# Patient Record
Sex: FEMALE | Race: WHITE | NOT HISPANIC OR LATINO | Employment: FULL TIME | URBAN - METROPOLITAN AREA
[De-identification: names, ages, dates, MRNs, and addresses within clinical notes are randomized per-mention and may not be internally consistent; named-entity substitution may affect disease eponyms.]

---

## 2019-02-21 ENCOUNTER — OFFICE VISIT (OUTPATIENT)
Dept: URGENT CARE | Facility: PHYSICIAN GROUP | Age: 39
End: 2019-02-21

## 2019-02-21 VITALS
HEIGHT: 63 IN | OXYGEN SATURATION: 97 % | DIASTOLIC BLOOD PRESSURE: 70 MMHG | BODY MASS INDEX: 20.91 KG/M2 | HEART RATE: 76 BPM | WEIGHT: 118 LBS | TEMPERATURE: 99.3 F | SYSTOLIC BLOOD PRESSURE: 122 MMHG | RESPIRATION RATE: 14 BRPM

## 2019-02-21 DIAGNOSIS — Z02.4 DRIVER'S PERMIT PHYSICAL EXAMINATION: ICD-10-CM

## 2019-02-21 PROCEDURE — 7100 PR DOT PHYSICAL: Performed by: FAMILY MEDICINE

## 2019-02-22 NOTE — PROGRESS NOTES
"Subjective:      Shirley Mckenna is a 38 y.o. female who presents with Employment Physical (DOT)            Patient is here for DOT physical, otherwise no complaints.        ROS       Objective:     /70   Pulse 76   Temp 37.4 °C (99.3 °F) (Temporal)   Resp 14   Ht 1.588 m (5' 2.5\")   Wt 53.5 kg (118 lb)   SpO2 97%   BMI 21.24 kg/m²      Physical Exam            Assessment/Plan:     ASSESSMENT:PLAN:  1. 's permit physical examination    History and physical and form see the scanned forms  Cleared for 2 years  We discussed tobacco use and encourage quitting and also establishing care and following up for screening testing that she needs at her age.      "

## 2019-03-28 ENCOUNTER — OFFICE VISIT (OUTPATIENT)
Dept: URGENT CARE | Facility: CLINIC | Age: 39
End: 2019-03-28
Payer: COMMERCIAL

## 2019-03-28 ENCOUNTER — APPOINTMENT (OUTPATIENT)
Dept: RADIOLOGY | Facility: IMAGING CENTER | Age: 39
End: 2019-03-28
Attending: FAMILY MEDICINE
Payer: COMMERCIAL

## 2019-03-28 VITALS
RESPIRATION RATE: 16 BRPM | DIASTOLIC BLOOD PRESSURE: 64 MMHG | OXYGEN SATURATION: 98 % | BODY MASS INDEX: 21.71 KG/M2 | HEART RATE: 78 BPM | SYSTOLIC BLOOD PRESSURE: 118 MMHG | TEMPERATURE: 99.8 F | HEIGHT: 62 IN | WEIGHT: 118 LBS

## 2019-03-28 DIAGNOSIS — M79.604 RIGHT LEG PAIN: ICD-10-CM

## 2019-03-28 PROCEDURE — 73590 X-RAY EXAM OF LOWER LEG: CPT | Mod: TC,RT | Performed by: FAMILY MEDICINE

## 2019-03-28 PROCEDURE — 99214 OFFICE O/P EST MOD 30 MIN: CPT | Performed by: FAMILY MEDICINE

## 2019-03-29 NOTE — PROGRESS NOTES
"Subjective:      Shirley Mckenna is a 38 y.o. female who presents with Stress (Feels pain in tibia x 4 days )            This is a new problem  38-year-old female presenting with right leg pain anterior for the past 3 days.  She does not recall any injury.  Denies any paresthesia or radiculopathy.  Works 2 jobs and she also works at the [employer].  No prior fracture reported.  She was worried about stress fracture of the tibia.        Review of Systems   All other systems reviewed and are negative.         Objective:     /64   Pulse 78   Temp 37.7 °C (99.8 °F)   Resp 16   Ht 1.575 m (5' 2\")   Wt 53.5 kg (118 lb)   SpO2 98%   BMI 21.58 kg/m²      Physical Exam   Constitutional: She is oriented to person, place, and time. She appears well-developed and well-nourished.  Non-toxic appearance. No distress.   HENT:   Head: Normocephalic and atraumatic.   Right Ear: External ear normal.   Left Ear: External ear normal.   Eyes: Conjunctivae are normal.   Cardiovascular: Normal rate.    Pulmonary/Chest: Effort normal. No respiratory distress.   Musculoskeletal:        Right knee: Normal.        Right ankle: Normal.        Legs:  Very small hue of a bruise noted here which is tender to palpation   Neurological: She is alert and oriented to person, place, and time.   Skin: Skin is warm. No pallor.   Psychiatric: She has a normal mood and affect.     X-ray of the right tib-fib: Negative for acute abnormality my read          Assessment/Plan:     1. Right leg pain  - DX-TIBIA AND FIBULA RIGHT; Future    Likely contusion  Xray is negative and reassured (left a message on the phone, because patient left the  earlier)  OTC meds prn pain and icing  Follow up if not significantly improved as expected in 7-10 days, sooner if any worsening or new symptoms      "

## 2021-02-10 ENCOUNTER — OFFICE VISIT (OUTPATIENT)
Dept: URGENT CARE | Facility: PHYSICIAN GROUP | Age: 41
End: 2021-02-10

## 2021-02-10 VITALS
TEMPERATURE: 97.4 F | BODY MASS INDEX: 23 KG/M2 | SYSTOLIC BLOOD PRESSURE: 102 MMHG | HEART RATE: 81 BPM | WEIGHT: 125 LBS | HEIGHT: 62 IN | OXYGEN SATURATION: 98 % | DIASTOLIC BLOOD PRESSURE: 54 MMHG | RESPIRATION RATE: 16 BRPM

## 2021-02-10 DIAGNOSIS — Z02.4 ENCOUNTER FOR COMMERCIAL DRIVER MEDICAL EXAMINATION (CDME): ICD-10-CM

## 2021-02-10 PROCEDURE — 7100 PR DOT PHYSICAL: Performed by: NURSE PRACTITIONER

## 2021-02-10 NOTE — PROGRESS NOTES
CC) Here today for DOT -employment physical exam.   S) Reviewed History with pt.        No complaints    O) See physical exam forms and diagnostics attached to visit today.       A)   1. Encounter for  medical examination (CDME)           P) as documented on physical exam forms.   Qualified for 2 year certification

## 2021-06-09 PROBLEM — M47.818 ARTHRITIS OF RIGHT SACROILIAC JOINT: Status: ACTIVE | Noted: 2021-06-09

## 2021-06-09 PROBLEM — M79.18 MYOFASCIAL PAIN SYNDROME OF LUMBAR SPINE: Status: ACTIVE | Noted: 2021-06-09

## 2021-06-09 PROBLEM — M54.9 INTRACTABLE BACK PAIN: Status: ACTIVE | Noted: 2021-06-09

## 2021-06-09 PROBLEM — M51.36 OTHER INTERVERTEBRAL DISC DEGENERATION, LUMBAR REGION: Status: ACTIVE | Noted: 2021-06-09

## 2022-11-08 ENCOUNTER — HOSPITAL ENCOUNTER (OUTPATIENT)
Dept: RADIOLOGY | Facility: MEDICAL CENTER | Age: 42
End: 2022-11-08
Attending: STUDENT IN AN ORGANIZED HEALTH CARE EDUCATION/TRAINING PROGRAM
Payer: COMMERCIAL

## 2022-11-08 ENCOUNTER — OCCUPATIONAL MEDICINE (OUTPATIENT)
Dept: URGENT CARE | Facility: PHYSICIAN GROUP | Age: 42
End: 2022-11-08
Payer: COMMERCIAL

## 2022-11-08 VITALS
HEIGHT: 62 IN | HEART RATE: 91 BPM | OXYGEN SATURATION: 98 % | DIASTOLIC BLOOD PRESSURE: 80 MMHG | TEMPERATURE: 98.6 F | RESPIRATION RATE: 16 BRPM | BODY MASS INDEX: 22.08 KG/M2 | SYSTOLIC BLOOD PRESSURE: 128 MMHG | WEIGHT: 120 LBS

## 2022-11-08 DIAGNOSIS — M79.672 LEFT FOOT PAIN: ICD-10-CM

## 2022-11-08 DIAGNOSIS — M65.9 TENOSYNOVITIS OF TIBIALIS POSTERIOR TENDON: ICD-10-CM

## 2022-11-08 PROCEDURE — 73630 X-RAY EXAM OF FOOT: CPT | Mod: RT

## 2022-11-08 PROCEDURE — 99213 OFFICE O/P EST LOW 20 MIN: CPT | Performed by: STUDENT IN AN ORGANIZED HEALTH CARE EDUCATION/TRAINING PROGRAM

## 2022-11-08 NOTE — LETTER
"EMPLOYEE’S CLAIM FOR COMPENSATION/ REPORT OF INITIAL TREATMENT  FORM C-4    EMPLOYEE’S CLAIM - PROVIDE ALL INFORMATION REQUESTED   First Name  Shirley Last Name  Bala Birthdate                    1980                Sex  female Claim Number (Insurer’s Use Only)    Home Address  [address] Age  42 y.o. Height  1.575 m (5' 2\") Weight  54.4 kg (120 lb) [facility]     [facility] Zip  [address] Telephone  [phone] (home)    Mailing Address  [address]  [facility] Zip  [address] Primary Language Spoken  English    Insurer   Third-Party   Dontae Villagran   Employee's Occupation (Job Title) When Injury or Occupational Disease Occurred      Employer's Name/Company Name  [employer]  Telephone  [phone]    Office Mail Address (Number and Street)   [address] 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 (Be specific an answer in detail. Use additional sheet if necessary)  onset of pain   If you believe that you have an occupational disease, when did you first have knowledge of the disability and it relationship to your employment?   Witnesses to the Accident  None      Nature of Injury or Occupational Disease  Workers' Compensation  Part(s) of Body Injured or Affected  Foot (R), Foot (R),     I certify that the above is true and correct to the best of my knowledge and that I have provided this information in order to obtain the benefits of [place]  I hereby authorize any physician, chiropractor, surgeon, practitioner, or other person, any hospital, including [facility] hospital, any medical service organization, any insurance company, or other institution or organization to release to each other, any medical or other information, including benefits paid or payable, pertinent to this injury or disease, except information relative to diagnosis, treatment and/or counseling for AIDS, psychological conditions, alcohol or controlled substances, for which I must give specific authorization.  A Photostat of this authorization shall be as valid as the original.     Date   Place Employee’s Original or  *Electronic Signature   THIS REPORT MUST BE COMPLETED AND MAILED WITHIN 3 WORKING DAYS OF TREATMENT   [facility]  Name of Facility  [place]   Date  11/8/2022 Diagnosis and Description of Injury or Occupational Disease  (M79.672) right foot pain  (M65.9) Tenosynovitis of tibialis posterior tendon Is there evidence the injured employee was under the influence of alcohol and/or another controlled substance at the time of accident?  ? No ? Yes (if yes, please explain)    Hour  10:19 AM   Diagnoses of right foot pain and Tenosynovitis of tibialis posterior tendon were pertinent to this visit. No   Treatment  X-rays negative for any acute osseous abnormalities.  Suspect symptoms secondary to posterior tibialis tendon dysfunction.  -Restrictions per D 39  -Recommended getting insoles (eg Superfeet)  - Ibuprofen 800 mg every 8 hours as needed for pain  - Follow-up with occupational medicine in 1 week for reevaluation at [address] [place] [address].    Have you advised the patient to remain off work five days or     more?    X-Ray Findings  Negative   ? Yes Indicate dates:   From   To      From information given by the employee, together with medical evidence, can        you  "directly connect this injury or occupational disease as job incurred?  No ? No If no, is the injured employee capable of:  ? full duty  No ? modified duty  Yes   Is additional medical care by a physician indicated?  Yes If Modified Duty, Specify any Limitations / Restrictions  See d39   Do you know of any previous injury or disease contributing to this condition or occupational disease?  ? Yes ? No (Explain if yes)                          No   Date  11/8/2022 Print Health Care Provider's   Sid Rodriguez D.O. I certify the employer’s copy of  this form was mailed on:   Address  [address] Insurer’s Use Only     [facility] Zip  [address]    Provider’s Tax ID Number  [id number] Telephone  Dept: [phone]             Health Care Provider’s Original or Electronic Signature  e-SID Herman D.O. Degree (MD,DO, DC,PAPeriC,APRN)   DO      * Complete and attach Release of Information (Form C-4A) when injured employee signs C-4 Form electronically  ORIGINAL - TREATING HEALTHCARE PROVIDER PAGE 2 - INSURER/TPA PAGE 3 - EMPLOYER PAGE 4 - EMPLOYEE             Form C-4 (rev.08/21)           BRIEF DESCRIPTION OF RIGHTS AND BENEFITS  (Pursuant to [place] 616C.050)    Notice of Injury or Occupational Disease (Incident Report Form C-1): If an injury or occupational disease (OD) arises out of and in the course of employment, you must provide written notice to your employer as soon as practicable, but no later than 7 days after the accident or OD. Your employer shall maintain a sufficient supply of the required forms.    Claim for Compensation (Form C-4): If medical treatment is sought, the form C-4 is available at the place of initial treatment. A completed \"Claim for Compensation\" (Form C-4) must be filed within 90 days after an accident or OD. The treating physician or chiropractor must, within 3 working days after treatment, complete and mail to the employer, the employer's insurer and third-party " , the Claim for Compensation.    Medical Treatment: If you require medical treatment for your on-the-job injury or OD, you may be required to select a physician or chiropractor from a list provided by your workers’ compensation insurer, if it has contracted with an Organization for Managed Care (MCO) or Preferred Provider Organization (PPO) or providers of health care. If your employer has not entered into a contract with an MCO or PPO, you may select a physician or chiropractor from the Panel of Physicians and Chiropractors. Any medical costs related to your industrial injury or OD will be paid by your insurer.    Temporary Total Disability (TTD): If your doctor has certified that you are unable to work for a period of at least 5 consecutive days, or 5 cumulative days in a 20-day period, or places restrictions on you that your employer does not accommodate, you may be entitled to TTD compensation.    Temporary Partial Disability (TPD): If the wage you receive upon reemployment is less than the compensation for TTD to which you are entitled, the insurer may be required to pay you TPD compensation to make up the difference. TPD can only be paid for a maximum of 24 months.    Permanent Partial Disability (PPD): When your medical condition is stable and there is an indication of a PPD as a result of your injury or OD, within 30 days, your insurer must arrange for an evaluation by a rating physician or chiropractor to determine the degree of your PPD. The amount of your PPD award depends on the date of injury, the results of the PPD evaluation, your age and wage.    Permanent Total Disability (PTD): If you are medically certified by a treating physician or chiropractor as permanently and totally disabled and have been granted a PTD status by your insurer, you are entitled to receive monthly benefits not to exceed 66 2/3% of your average monthly wage. The amount of your PTD payments is subject to reduction  if you previously received a lump-sum PPD award.    Vocational Rehabilitation Services: You may be eligible for vocational rehabilitation services if you are unable to return to the job due to a permanent physical impairment or permanent restrictions as a result of your injury or occupational disease.    Transportation and Per Antwon Reimbursement: You may be eligible for travel expenses and per antwon associated with medical treatment.    Reopening: You may be able to reopen your claim if your condition worsens after claim closure.     Appeal Process: If you disagree with a written determination issued by the insurer or the insurer does not respond to your request, you may appeal to the Department of Administration, , by following the instructions contained in your determination letter. You must appeal the determination within 70 days from the date of the determination letter at [address], [address], [place], [place] [address], or [address], [address], [place] [address]. If you disagree with the  decision, you may appeal to the Department of Administration, . You must file your appeal within 30 days from the date of the  decision letter at [address], [address], [place], [place] [address], or [address], [address], [place], [place] [address]. If you disagree with a decision of an , you may file a petition for judicial review with the District Court. You must do so within 30 days of the Appeal Officer’s decision. You may be represented by an  at your own expense or you may contact the [facility] for possible representation.    [place]  for Injured Workers (NAIW): If you disagree with a  decision, you may request that NAIW represent you without charge at an  Hearing. For information regarding denial of benefits, you may contact the [facility] at: [address], [address], [place] [address], [phone]) [phone], or [address], [address], [place] [address], [phone]    To File a Complaint with the Division: If you wish to file a complaint with the  of the Division of Industrial Relations (DIR),  please contact the Workers’ Compensation Section, [address], [address], [place], [place] [address], telephone (196) [phone], or [address], [address], [place], [place] [address], telephone [phone]) [phone].    For assistance with Workers’ Compensation Issues: You may contact the [facility] Office for Consumer Health Assistance, [address] [facility] [address], [place] [address], Toll Free 1-646.885.3052, Web site: http://[facility].nv.[facility]/Programs/ROSA E-mail: [email or web address].[email or web address]              __________________________________________________________________                                    _________________            Employee Name / Signature                                                                                                                            Date                                                                                                                                                                                                                              D-2 (rev. 10/20)

## 2022-11-08 NOTE — PROGRESS NOTES
"Subjective:     Shirley Mckenna is a 42 y.o. female who presents for Work-Related Injury ([employer] R foot)      Today's date: 11/8/2022  Date of injury: Sometime in October  CC: Right foot pain  Patient presents with a chief complaint of pain along the medial margins of her left foot of insidious onset for approximately 5-6 weeks.  She points to the medial margins of the navicular as source of discomfort.  Symptoms are aggravated with weightbearing and local palpation to the region.  She has tried wearing an ankle brace which has not helped.  Denies any associated symptoms of bruising or swelling.  No history of trauma or surgery to the region.    PMH:   No pertinent past medical history to this problem  MEDS:  Medications were reviewed in EMR  ALLERGIES:  Allergies were reviewed in EMR  FH:   No pertinent family history to this problem       Objective:     /80   Pulse 91   Temp 37 °C (98.6 °F) (Temporal)   Resp 16   Ht 1.575 m (5' 2\")   Wt 54.4 kg (120 lb)   LMP  (LMP Unknown)   SpO2 98%   BMI 21.95 kg/m²     Gen: no acute distress, normal voice  Skin: dry, intact, moist mucosal membranes  Head: Atraumatic, normocephalic  Psych: normal affect, normal judgement, alert, awake  Musculoskeletal: Right foot: No erythema, ecchymosis or edema.  Full range of motion in all planes associated with mild discernible discomfort with resisted plantar flexion.  Focal TTP along the medial margins of the navicular.  No additional focal tenderness to palpation.      RADIOLOGY RESULTS  DX-FOOT-COMPLETE 3+ RIGHT    Result Date: 11/8/2022 11/8/2022 10:54 AM HISTORY/REASON FOR EXAM:  Atraumatic Pain/Swelling/Deformity; ttp along navicular Right medial foot pain TECHNIQUE/EXAM DESCRIPTION AND NUMBER OF VIEWS: 3 views of the RIGHT foot. COMPARISON:  Right leg x-ray 3/20/2019 FINDINGS: There is no fracture. Alignment is normal. No degenerative changes are present. 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 There is a " plantar calcaneal spur.     1.  Accessory navicular, anatomic variant 2.  Plantar calcaneal spur            Assessment/Plan:       1. right foot pain  - DX-FOOT-COMPLETE 3+ RIGHT; Future    2. Tenosynovitis of tibialis posterior tendon    Released to Full Duty FROM 11/8/2022 TO 11/15/2022  X-rays negative for any acute osseous abnormalities.  Suspect symptoms secondary to posterior tibialis tendon dysfunction.  -Restrictions per D 39  -Recommended getting insoles  - Ibuprofen 800 mg every 8 hours as needed for pain  - Follow-up with occupational medicine in 1 week for reevaluation at [address] [place] [address].       Differential diagnosis, natural history, supportive care, and indications for immediate follow-up discussed.

## 2022-11-08 NOTE — LETTER
[facility]  [address] [facility] [facility] [place] [address]  Phone:  [phone] - Fax:  [phone]   [facility] Progress Report and Disability Certification  Date of Service: 11/8/2022   No Show:  No  Date / Time of Next Visit: 11/15/2022 w/ OCC MED   Claim Information   Patient Name: Shirley Mckenna  Claim Number:     Employer: [employer]  Date of Injury: 10/4/2022     Insurer / TPA: Dontae [place]  ID / SSN:     Occupation:   Diagnosis: Diagnoses of right foot pain and Tenosynovitis of tibialis posterior tendon were pertinent to this visit.    Medical Information   Related to Industrial Injury?   Comments:possibly    Subjective Complaints:  Today's date: 11/8/2022  Date of injury: Sometime in October  CC: Right foot pain  Patient presents with a chief complaint of pain along the medial margins of her left foot of insidious onset for approximately 5-6 weeks.  She points to the medial margins of the navicular as source of discomfort.  Symptoms are aggravated with weightbearing and local palpation to the region.  She has tried wearing an ankle brace which has not helped.  Denies any associated symptoms of bruising or swelling.  No history of trauma or surgery to the region.   Objective Findings: Gen: no acute distress, normal voice  Skin: dry, intact, moist mucosal membranes  Head: Atraumatic, normocephalic  Psych: normal affect, normal judgement, alert, awake  Musculoskeletal: Right foot: No erythema, ecchymosis or edema.  Full range of motion in all planes associated with mild discernible discomfort with resisted plantar flexion.  Focal TTP along the medial margins of the navicular.  No additional focal tenderness to palpation.      RADIOLOGY RESULTS  DX-FOOT-COMPLETE 3+ RIGHT    Result Date: 11/8/2022 11/8/2022 10:54 AM HISTORY/REASON FOR EXAM:  Atraumatic Pain/Swelling/Deformity; ttp along navicular Right medial foot pain TECHNIQUE/EXAM  DESCRIPTION AND NUMBER OF VIEWS: 3 views of the RIGHT foot. COMPARISON:  Right leg x-ray 3/20/2019 FINDINGS: There is no fracture. Alignment is normal. No degenerative changes are present. 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 There is a plantar calcaneal spur.     1.  Accessory navicular, anatomic variant 2.  Plantar calcaneal spur           Pre-Existing Condition(s):     Assessment:   Initial Visit    Status: Additional Care Required  Permanent Disability:No    Plan:      Diagnostics:      Comments:       Disability Information   Status: Released to Full Duty    From:  11/8/2022  Through: 11/15/2022 Restrictions are: Temporary   Physical Restrictions   Sitting:    Standing:  < or = to 4 hrs/day Stooping:    Bending:      Squatting:    Walking:  < or = to 4 hrs/day Climbing:    Pushing:      Pulling:    Other:    Reaching Above Shoulder (L):   Reaching Above Shoulder (R):       Reaching Below Shoulder (L):    Reaching Below Shoulder (R):      Not to exceed Weight Limits   Carrying(hrs):   Weight Limit(lb): < or = to 25 pounds Lifting(hrs):   Weight  Limit(lb): < or = to 25 pounds   Comments: X-rays negative for any acute osseous abnormalities.  Suspect symptoms secondary to posterior tibialis tendon dysfunction.  -Restrictions per D 39  -Recommended getting insoles  - Ibuprofen 800 mg every 8 hours as needed for pain  - Follow-up with occupational medicine in 1 week for reevaluation at [address] [place] [address]    Repetitive Actions   Hands: i.e. Fine Manipulations from Grasping:     Feet: i.e. Operating Foot Controls:     Driving / Operate Machinery:     Health Care Provider’s Original or Electronic Signature  Sid Rodriguez D.O. Health Care Provider’s Original or Electronic Signature    Luther Holt MD         Clinic Name / Location: [facility] [address] [address] Clinic Phone Number: Dept: [phone]   Appointment Time: 9:45 Am Visit Start Time: 10:19 AM   Check-In Time:   9:50 Am Visit Discharge Time:     Original-Treating Physician or Chiropractor    Page 2-Insurer/TPA    Page 3-Employer    Page 4-Employee

## 2022-12-06 ENCOUNTER — OCCUPATIONAL MEDICINE (OUTPATIENT)
Dept: OCCUPATIONAL MEDICINE | Facility: CLINIC | Age: 42
End: 2022-12-06
Payer: COMMERCIAL

## 2022-12-06 VITALS
DIASTOLIC BLOOD PRESSURE: 72 MMHG | BODY MASS INDEX: 22.08 KG/M2 | HEIGHT: 62 IN | WEIGHT: 120 LBS | TEMPERATURE: 99.5 F | OXYGEN SATURATION: 98 % | RESPIRATION RATE: 18 BRPM | SYSTOLIC BLOOD PRESSURE: 108 MMHG | HEART RATE: 90 BPM

## 2022-12-06 DIAGNOSIS — M65.9 TENOSYNOVITIS OF TIBIALIS POSTERIOR TENDON: ICD-10-CM

## 2022-12-06 PROCEDURE — 99213 OFFICE O/P EST LOW 20 MIN: CPT | Performed by: PREVENTIVE MEDICINE

## 2022-12-06 RX ORDER — MELOXICAM 15 MG/1
15 TABLET ORAL DAILY
Qty: 30 TABLET | Refills: 0 | Status: SHIPPED | OUTPATIENT
Start: 2022-12-06 | End: 2023-03-22

## 2022-12-06 NOTE — PROGRESS NOTES
"Subjective:     Shirley Mckenna is a 42 y.o. 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 VERITO: Gradual onset of pain along the medial margins of her left foot of insidious onset for approximately 5-6 weeks. Seen in UCx1, XR of right foot negative for acute findings. Suspected symptoms secondary to posterior tibialis tendon dysfunction recommended shoe inserts and Ibuprofen 800mg TID.  Patient states that symptoms remain the same.  She has been on light duty and taking ibuprofen and got new shoe inserts she states that neither of these interventions have improved symptoms.  Continues with pain on the medial midfoot region.  Pain worse with prolonged standing or walking.  She also notes some pain along the Achilles tendon.  This is less prominent than pain on the foot.  She denies any numbness or tingling.    ROS: All systems were reviewed on intake form, form was reviewed and signed. See scanned documents in media. Pertinent positives and negatives included in HPI.    PMH: No pertinent past medical history to this problem  MEDS: Medications were reviewed in Epic  ALLERGIES:   Allergies   Allergen Reactions    Penicillins Shortness of Breath and Swelling    Erythromycin      Pt does not know why she can't take     SOCHX: Works as a  at [employer]  FH: No pertinent family history to this problem       Objective:     /72   Pulse 90   Temp 37.5 °C (99.5 °F)   Resp 18   Ht 1.575 m (5' 2\")   Wt 54.4 kg (120 lb)   LMP  (LMP Unknown)   SpO2 98%   BMI 21.95 kg/m²     Constitutional: Patient is in no acute distress. Appears well-developed and well-nourished.   HENT: Normocephalic and atraumatic. EOM are normal. No scleral icterus.   Cardiovascular: Normal rate.    Pulmonary/Chest: Effort normal. No respiratory distress.   Neurological: Patient is alert and oriented to person, place, and time. "   Skin: Skin is warm and dry.   Psychiatric: Normal mood and affect. Behavior is normal.     Right foot: No gross deformity.  Tenderness over the medial navicular.  Some minimal tenderness over the distal Achilles tendon as well.  No defect felt in the Achilles tendon.  Full range of motion without discomfort.  Strength grossly intact of the foot.    Assessment/Plan:       1. Tenosynovitis of tibialis posterior tendon  - Referral to Physical Therapy  - meloxicam (MOBIC) 15 MG tablet; Take 1 Tablet by mouth every day.  Dispense: 30 Tablet; Refill: 0    Released to Restricted Duty FROM 12/6/2022 TO 1/4/2023  Seated work 25% of shift, alternate sitting/standing as needed. 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    Approximately 35 minutes were spent in reviewing notes, preparing for visit, obtaining history, exam and evaluation, patient counseling/education and post visit documentation/orders.

## 2022-12-06 NOTE — LETTER
[address],   [address] [place] [address]  Phone:  [phone] - Fax:  [phone]   [facility] Progress Report and Disability Certification  Date of Service: 12/6/2022   No Show:  No  Date / Time of Next Visit: 1/4/2023 @ 10:30am   Claim Information   Patient Name: Shirley Mckenna  Claim Number:     Employer: [employer]  Date of Injury: 10/4/2022     Insurer / TPA: Dontae Chalfont  ID / SSN:     Occupation:   Diagnosis: The encounter diagnosis was Tenosynovitis of tibialis posterior tendon.    Medical Information   Related to Industrial Injury?      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 VERITO: Gradual onset of pain along the medial margins of her left foot of insidious onset for approximately 5-6 weeks. Seen in UCx1, XR of right foot negative for acute findings. Suspected symptoms secondary to posterior tibialis tendon dysfunction recommended shoe inserts and Ibuprofen 800mg TID.  Patient states that symptoms remain the same.  She has been on light duty and taking ibuprofen and got new shoe inserts she states that neither of these interventions have improved symptoms.  Continues with pain on the medial midfoot region.  Pain worse with prolonged standing or walking.  She also notes some pain along the Achilles tendon.  This is less prominent than pain on the foot.  She denies any numbness or tingling.   Objective Findings: Right foot: No gross deformity.  Tenderness over the medial navicular.  Some minimal tenderness over the distal Achilles tendon as well.  No defect felt in the Achilles tendon.  Full range of motion without discomfort.  Strength grossly intact of the foot.   Pre-Existing Condition(s):     Assessment:   Condition Same    Status: Additional Care Required  Permanent Disability:No    Plan:      Diagnostics:      Comments:  Recommend continued  conservative management  Referral to physical therapy and prescribed meloxicam 50 mg once daily  May take Tylenol 1000 mg 3 times daily as well  Continue shoes with new inserts  Range of motion/strengthening exercises as demonstrated  Ice 20 minutes 3-3 times per day as needed  OTC muscle creams/ointments as needed  Restricted duty  Follow-up 4 weeks    Disability Information   Status: Released to Restricted Duty    From:  12/6/2022  Through: 1/4/2023 Restrictions are: Temporary   Physical Restrictions   Sitting:    Standing:  < or = to 6 hrs/day Stooping:    Bending:      Squatting:    Walking:  < or = to 6 hrs/day Climbing:    Pushing:      Pulling:    Other:    Reaching Above Shoulder (L):   Reaching Above Shoulder (R):       Reaching Below Shoulder (L):    Reaching Below Shoulder (R):      Not to exceed Weight Limits   Carrying(hrs):   Weight Limit(lb): < or = to 25 pounds Lifting(hrs):   Weight  Limit(lb): < or = to 25 pounds   Comments: Seated work 25% of shift, alternate sitting/standing as needed. 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 Fine Manipulations from Grasping:     Feet: i.e. Operating Foot Controls:     Driving / Operate Machinery:     Health Care Provider’s Original or Electronic Signature  Jb Payne D.O. Health Care Provider’s Original or Electronic Signature    Jb Payne DO MPH     Clinic Name / Location: [address],   [address]  [place] [address] Clinic Phone Number: Dept: [phone]   Appointment Time: 1:00 Pm Visit Start Time: 1:00 PM   Check-In Time:  12:38 Pm Visit Discharge Time:  130pm   Original-Treating Physician or Chiropractor    Page 2-Insurer/TPA    Page 3-Employer    Page 4-Employee

## 2023-01-04 ENCOUNTER — OCCUPATIONAL MEDICINE (OUTPATIENT)
Dept: OCCUPATIONAL MEDICINE | Facility: CLINIC | Age: 43
End: 2023-01-04
Payer: COMMERCIAL

## 2023-01-04 VITALS
HEART RATE: 100 BPM | OXYGEN SATURATION: 95 % | RESPIRATION RATE: 18 BRPM | DIASTOLIC BLOOD PRESSURE: 74 MMHG | BODY MASS INDEX: 22.08 KG/M2 | HEIGHT: 62 IN | WEIGHT: 120 LBS | SYSTOLIC BLOOD PRESSURE: 120 MMHG

## 2023-01-04 DIAGNOSIS — M65.9 TENOSYNOVITIS OF TIBIALIS POSTERIOR TENDON: ICD-10-CM

## 2023-01-04 PROCEDURE — 99212 OFFICE O/P EST SF 10 MIN: CPT | Performed by: PREVENTIVE MEDICINE

## 2023-01-04 NOTE — PROGRESS NOTES
"Subjective:     Shirley Mckenna is a 42 y.o. 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 VERITO: Gradual onset of pain along the medial margins of her left foot of insidious onset for approximately 5-6 weeks.  XR of right foot negative for acute findings.  Patient states overall symptoms have been gradually improving over the last few weeks.  She states the meloxicam has been helping.  She states she feels almost ready to return to full duty.  Of note her claim was denied and so the physical therapy referral was denied as well.    ROS           Objective:     /74   Pulse 100   Resp 18   Ht 1.575 m (5' 2\")   Wt 54.4 kg (120 lb)   SpO2 95%   BMI 21.95 kg/m²     Constitutional: Patient is in no acute distress. Appears well-developed and well-nourished.   Cardiovascular: Normal rate.    Pulmonary/Chest: Effort normal. No respiratory distress.   Neurological: Patient is alert and oriented to person, place, and time.   Skin: Skin is warm and dry.   Psychiatric: Normal mood and affect. Behavior is normal.     Right foot: No gross deformity.  Full range of motion.  Normal gait.    Assessment/Plan:       1. Tenosynovitis of tibialis posterior tendon      Comments:Restricted duty through Saturday, January 7, may begin full duty starting Sunday January 8 FROM 1/4/2023 TO    Restrictions thru Jan 7th: Seated work 25% of shift, alternate sitting/standing as needed. 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  Restricted duty through Saturday, January 7, may begin full duty starting Sunday January 8  Expect gradual resolution of symptoms over the next few weeks  Restricted duty through Saturday, January 7, may begin full duty starting Sunday January 8  Release from care  Follow-up as needed    Differential diagnosis, natural history, supportive care, and indications for immediate follow-up " discussed.    Approximately 15 minutes was spent in preparing for visit, obtaining history, exam and evaluation, patient counseling/education and post visit documentation/orders.

## 2023-01-04 NOTE — LETTER
[address],   [address] [place] [address]  Phone:  [phone] - Fax:  [phone]   [facility] Progress Report and Disability Certification  Date of Service: 1/4/2023   No Show:  No  Date / Time of Next Visit:  Release from care   Claim Information   Patient Name: Shirley Mckenna  Claim Number:     Employer: [employer]  Date of Injury: 10/4/2022     Insurer / TPA: Dontae Charlotte  ID / SSN:     Occupation:   Diagnosis: The encounter diagnosis was Tenosynovitis of tibialis posterior tendon.    Medical Information   Related to Industrial Injury? 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 VERITO: Gradual onset of pain along the medial margins of her left foot of insidious onset for approximately 5-6 weeks.  XR of right foot negative for acute findings.  Patient states overall symptoms have been gradually improving over the last few weeks.  She states the meloxicam has been helping.  She states she feels almost ready to return to full duty.  Of note her claim was denied and so the physical therapy referral was denied as well.   Objective Findings: Right foot: No gross deformity.  Full range of motion.  Normal gait.   Pre-Existing Condition(s):     Assessment:   Condition Improved    Status: Discharged /  MMI  Permanent Disability:No    Plan:      Diagnostics:      Comments:  Expect gradual resolution of symptoms over the next few weeks  Restricted duty through Saturday, January 7, may begin full duty starting Sunday January 8  Release from care  Follow-up as needed    Disability Information   Status:   Comments:Restricted duty through Saturday, January 7, may begin full duty starting Sunday January 8    From:  1/4/2023  Through:   Restrictions are:     Physical Restrictions   Sitting:    Standing:    Stooping:    Bending:      Squatting:     Walking:    Climbing:    Pushing:      Pulling:    Other:    Reaching Above Shoulder (L):   Reaching Above Shoulder (R):       Reaching Below Shoulder (L):    Reaching Below Shoulder (R):      Not to exceed Weight Limits   Carrying(hrs):   Weight Limit(lb):   Lifting(hrs):   Weight  Limit(lb):     Comments: Restrictions thru Jan 7th: Seated work 25% of shift, alternate sitting/standing as needed. 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  Restricted duty through Saturday, January 7, may begin full duty starting Sunday January 8    Repetitive Actions   Hands: i.e. Fine Manipulations from Grasping:     Feet: i.e. Operating Foot Controls:     Driving / Operate Machinery:     Health Care Provider’s Original or Electronic Signature  Jb Payne D.O. Health Care Provider’s Original or Electronic Signature    Jb Payne DO MPH     Clinic Name / Location: [address],   [address]  [place] [address] Clinic Phone Number: Dept: [phone]   Appointment Time: 10:30 Am Visit Start Time: 10:27 AM   Check-In Time:  10:18 Am Visit Discharge Time:  10:58AM   Original-Treating Physician or Chiropractor    Page 2-Insurer/TPA    Page 3-Employer    Page 4-Employee

## 2023-03-01 ENCOUNTER — HOSPITAL ENCOUNTER (OUTPATIENT)
Dept: RADIOLOGY | Facility: MEDICAL CENTER | Age: 43
End: 2023-03-01
Attending: ORTHOPAEDIC SURGERY
Payer: COMMERCIAL

## 2023-03-01 DIAGNOSIS — Q74.2 ACCESSORY NAVICULAR BONE OF RIGHT FOOT: ICD-10-CM

## 2023-03-01 PROCEDURE — 73700 CT LOWER EXTREMITY W/O DYE: CPT | Mod: RT

## 2023-03-11 ENCOUNTER — OFFICE VISIT (OUTPATIENT)
Dept: URGENT CARE | Facility: PHYSICIAN GROUP | Age: 43
End: 2023-03-11

## 2023-03-11 VITALS
BODY MASS INDEX: 22.6 KG/M2 | HEART RATE: 73 BPM | TEMPERATURE: 98 F | OXYGEN SATURATION: 98 % | RESPIRATION RATE: 16 BRPM | HEIGHT: 62 IN | WEIGHT: 122.8 LBS | SYSTOLIC BLOOD PRESSURE: 118 MMHG | DIASTOLIC BLOOD PRESSURE: 76 MMHG

## 2023-03-11 DIAGNOSIS — Z02.89 ENCOUNTER FOR EXAMINATION REQUIRED BY DEPARTMENT OF TRANSPORTATION (DOT): ICD-10-CM

## 2023-03-11 PROCEDURE — 7100 PR DOT PHYSICAL: Performed by: NURSE PRACTITIONER

## 2023-03-11 ASSESSMENT — ENCOUNTER SYMPTOMS
CHILLS: 0
FEVER: 0

## 2023-03-11 NOTE — PROGRESS NOTES
Subjective     Shirley Mckenna is a 42 y.o. 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 See scanned physical and health questionnaire. No PMH/FH congenital cardiac problems or early cardiac death before age of 50. Denies shortness of breath, chest pain or dizziness on exertion. Denies h/o asthma, seizures, headaches, head traumas/concussions. No h/o rashes/eczema. Takes no OTC or prescribed meds.  . Exam normal. No trauma, injury or surgeries. Does not wear corrective glasses/lens.    PMH:  has a past medical history of Hypertension.    She has no past medical history of ASTHMA, CAD (coronary artery disease), Cancer (HCC), Congestive heart failure (HCC), COPD, Diabetes, GERD (gastroesophageal reflux disease), Glaucoma, Goiter, Infectious disease, Liver disease, Psychiatric disorder, Renal disorder, Seizure disorder (HCC), or Stroke ([facility]).  MEDS:   Current Outpatient Medications:     meloxicam (MOBIC) 15 MG tablet, Take 1 Tablet by mouth every day., Disp: 30 Tablet, Rfl: 0    meloxicam (MOBIC) 15 MG tablet, Take 1 Tablet by mouth every day., Disp: 30 Tablet, Rfl: 0  ALLERGIES:   Allergies   Allergen Reactions    Penicillins Shortness of Breath and Swelling    Erythromycin      Pt does not know why she can't take     SURGHX:   Past Surgical History:   Procedure Laterality Date    GYN SURGERY      C-sections,     OTHER ORTHOPEDIC SURGERY      repair of fractured pelvis    TUBAL LIGATION       SOCHX:  reports that she has been smoking cigarettes. She has been smoking an average of 1 pack per day. She has never used smokeless tobacco. She reports that she does not drink alcohol and does not use drugs.  FH: Family history was reviewed, no pertinent findings to report    Review of Systems   Constitutional:  Negative for chills, fever and malaise/fatigue.   All other systems reviewed and are negative.           Objective     /76   Pulse 73   Temp 36.7 °C  "(98 °F) (Temporal)   Resp 16   Ht 1.575 m (5' 2\")   Wt 55.7 kg (122 lb 12.8 oz)   SpO2 98%   BMI 22.46 kg/m²      Physical Exam  Vitals reviewed.   Constitutional:       General: She is awake. She is not in acute distress.     Appearance: Normal appearance. She is well-developed. She is not ill-appearing, toxic-appearing or diaphoretic.   Cardiovascular:      Rate and Rhythm: Normal rate and regular rhythm.      Heart sounds: Normal heart sounds, S1 normal and S2 normal.   Pulmonary:      Effort: Pulmonary effort is normal.      Breath sounds: Normal breath sounds and air entry.   Neurological:      Mental Status: She is alert and oriented to person, place, and time.   Psychiatric:         Attention and Perception: Attention normal.         Mood and Affect: Mood normal.         Speech: Speech normal.         Behavior: Behavior normal. Behavior is cooperative.                           Assessment & Plan        1. Encounter for examination required by Department of Transportation (DOT)  Certify x 2 years                  "

## 2023-03-21 PROBLEM — M25.571 ACUTE RIGHT ANKLE PAIN: Status: ACTIVE | Noted: 2023-03-21

## 2023-03-21 PROBLEM — M79.671 RIGHT FOOT PAIN: Status: ACTIVE | Noted: 2023-03-21

## 2023-07-17 ENCOUNTER — OFFICE VISIT (OUTPATIENT)
Dept: URGENT CARE | Facility: PHYSICIAN GROUP | Age: 43
End: 2023-07-17

## 2023-07-17 VITALS
WEIGHT: 142 LBS | BODY MASS INDEX: 26.13 KG/M2 | HEART RATE: 80 BPM | OXYGEN SATURATION: 99 % | HEIGHT: 62 IN | TEMPERATURE: 97.7 F | RESPIRATION RATE: 18 BRPM | SYSTOLIC BLOOD PRESSURE: 120 MMHG | DIASTOLIC BLOOD PRESSURE: 62 MMHG

## 2023-07-17 DIAGNOSIS — Z02.89 ENCOUNTER FOR EXAMINATION REQUIRED BY DEPARTMENT OF TRANSPORTATION (DOT): ICD-10-CM

## 2023-07-17 PROCEDURE — 7100 PR DOT PHYSICAL: Performed by: PHYSICIAN ASSISTANT

## 2023-07-17 PROCEDURE — 3074F SYST BP LT 130 MM HG: CPT | Performed by: PHYSICIAN ASSISTANT

## 2023-07-17 PROCEDURE — 3078F DIAST BP <80 MM HG: CPT | Performed by: PHYSICIAN ASSISTANT

## 2023-07-17 NOTE — PROGRESS NOTES
Subjective:   Shirley Mckenna is a 42 y.o. female who presents for No chief complaint on file.    See scanned history and physical.  Patient denies any history of seizures, dialysis, insulin use, pacemaker/defibrillator, syncope, arrhythmias/murmurs, vertigo/meniere's disease, illicit drug use, regular sedating medication use, ETOH abuse, or any weakness/paresthesias to extremities.  Patient recently had right foot surgery, clearance letter orthopedic surgeon scanned in        Medications:  acetaminophen Tabs  gabapentin Caps  HYDROcodone/acetaminophen Tabs  hydrOXYzine pamoate Caps  ibuprofen Tabs  morphine ER Tbcr    Allergies:             Penicillins and Erythromycin    Surgical History:         Past Surgical History:   Procedure Laterality Date    PB OSTEOTOMY HEEL BONE Right 3/27/2023    Procedure: RIGHT FOOT ACCESSORY NAVICULAR EXCISION, RIGHT POSTERIOR TIBIAL TENDON ADVANCEMENT, RIGHT CALCANEAL OSTEOTOMY;  Surgeon: Gabe Garcia M.D.;  Location: [facility];  Service: Orthopedics    PB OSTEOTOMY MIDTARSAL BONES Right 3/27/2023    Procedure: RIGHT GASTROCNEMIUS RECESSION, RIGHT COTTON OSTEOTOMY;  Surgeon: Gabe Garcia M.D.;  Location: [facility];  Service: Orthopedics    GYN SURGERY      C-sections,     OTHER ORTHOPEDIC SURGERY      repair of fractured pelvis    TUBAL LIGATION         Past Social Hx:  Shirley Mckenna  reports that she has been smoking cigarettes. She has been smoking an average of 1 pack per day. She has never used smokeless tobacco. She reports that she does not drink alcohol and does not use drugs.     Past Family Hx:   Shirley Mckenna family history is not on file.       Problem list, medications, and allergies reviewed by myself today in Epic.     Objective:     There were no vitals taken for this visit.    Physical Exam  See scanned physical examination under media  Assessment/Plan:     Diagnosis and Associated  Orders:     1. Encounter for examination required by Department of Transportation (DOT)        Comments/MDM:  See scanned history and physical.  Patient denies any history of seizures, dialysis, insulin use, pacemaker/defibrillator, syncope, arrhythmias/murmurs, vertigo/meniere's disease, illicit drug use, regular sedating medication use, ETOH abuse, or any weakness/paresthesias to extremities. Certified for two years without restrictions.  Patient recently had right foot surgery.  Clearance letter from orthopedic surgeon scanned in chart.    Patient also noted to have nitrates and leukocytes on UA.  Patient advised to follow-up with PCP.  She is currently asymptomatic with no frequency urgency, flank pain.      I personally reviewed prior external notes and test results pertinent to today's visit. Supportive care, natural history, differential diagnoses, and indications for immediate follow-up discussed. Return to clinic or go to ED if symptoms worsen or persist.  Red flag symptoms discussed.  Patient/Parent/Guardian voices understanding. Follow-up with your primary care provider in 3-5 days.  All side effects of medication discussed including allergic response, GI upset, tendon injury, rash, sedation etc    Please note that this dictation was created using voice recognition software. I have made a reasonable attempt to correct obvious errors, but I expect that there are errors of grammar and possibly content that I did not discover before finalizing the note.    This note was electronically signed by Felicia Rebolledo PA-C

## 2024-01-20 ENCOUNTER — HOSPITAL ENCOUNTER (OUTPATIENT)
Dept: RADIOLOGY | Facility: MEDICAL CENTER | Age: 44
End: 2024-01-20
Attending: ORTHOPAEDIC SURGERY
Payer: COMMERCIAL

## 2024-01-20 DIAGNOSIS — Z98.890 STATUS POST OSTEOTOMY: ICD-10-CM

## 2024-01-20 DIAGNOSIS — M96.89 NONUNION OF BONE AFTER OSTEOTOMY: ICD-10-CM

## 2024-01-20 PROCEDURE — 73700 CT LOWER EXTREMITY W/O DYE: CPT | Mod: RT
